# Patient Record
(demographics unavailable — no encounter records)

---

## 2021-05-28 NOTE — REPMRS
Patient History

The patient states she had a clinical breast exam in May 2021.

 

Family history of colorectal cancer at age 50 in maternal cousin,

breast cancer under age 50 in maternal aunt, breast cancer under

age 50 and ovarian cancer under age 50 in sister.

No breast complaints today

Patient signed the MRS sheet

1st covid vaccine 2/3/21-left arm-Moderna

2nd covid vaccine 3/3/21-left arm

Priors on PACS

Patient Identification Verified

 

Digital Woman Screen Mammo: May 28, 2021 - Exam #: 

[id number]

Bilateral CC and MLO view(s) were taken.

 

Technologist: Tonja Plata, Technologist

Prior study comparison: June 11, 2018, bilateral digital woman 

screen mammo performed at [facility] 

[facility].  March 10, 2015, bilateral digital mammo screening bilat, 

performed at [facility].  May 29, 2013, bilateral 

digital mammo screening bilat, performed at [facility].

 

FINDINGS: There are scattered fibroglandular densities.  The 

Volpara volumetric breast density category is:B.  There has been 

no change in the appearance of the mammogram from the prior 

studies.  There is a mild amount of scattered fibroglandular 

density which is fairly symmetric. There is no interval 

development of dominant mass, architectural distortion, or 

grouped microcalcification suggestive of malignancy.

3-D tomosynthesis shows no additional findings.

 

Assessment: BI-RADS/ACR category 1 mammogram. Negative Mammogram.

 

Recommendation

Routine screening mammogram of both breasts in 1 year (for women 

over age 40).

This patient's  [facility] Lifetime Breast Cancer Risk is 

estimated at 11.8 %.

This mammogram was interpreted with the aid of an FDA-approved 

computer-aided dectection system.

 

Electronically Signed By: Mitchell Olivo MD 05/28/21 [id number]